# Patient Record
Sex: MALE | Race: WHITE
[De-identification: names, ages, dates, MRNs, and addresses within clinical notes are randomized per-mention and may not be internally consistent; named-entity substitution may affect disease eponyms.]

---

## 2018-03-09 ENCOUNTER — HOSPITAL ENCOUNTER (EMERGENCY)
Dept: HOSPITAL 80 - FED | Age: 73
Discharge: HOME | End: 2018-03-09
Payer: COMMERCIAL

## 2018-03-09 VITALS — OXYGEN SATURATION: 93 % | DIASTOLIC BLOOD PRESSURE: 96 MMHG | HEART RATE: 74 BPM | SYSTOLIC BLOOD PRESSURE: 154 MMHG

## 2018-03-09 VITALS — RESPIRATION RATE: 18 BRPM | TEMPERATURE: 97.9 F

## 2018-03-09 DIAGNOSIS — Z79.01: ICD-10-CM

## 2018-03-09 DIAGNOSIS — N28.1: Primary | ICD-10-CM

## 2018-03-09 LAB — PLATELET # BLD: 131 10^3/UL (ref 150–400)

## 2018-03-09 PROCEDURE — 99285 EMERGENCY DEPT VISIT HI MDM: CPT

## 2018-03-09 PROCEDURE — 74177 CT ABD & PELVIS W/CONTRAST: CPT

## 2018-03-09 PROCEDURE — 93005 ELECTROCARDIOGRAM TRACING: CPT

## 2018-03-09 NOTE — EDPHY
H & P


Time Seen by Provider: 03/09/18 15:46


HPI/ROS: 





CHIEF COMPLAINT:  Abdominal pain





HISTORY OF PRESENT ILLNESS:  Patient is had intermittent symptoms for the last 2

-3 weeks.  Since yesterday at 7:00 p.m. He has had central abdominal pain not 

helped by Pepto-Bismol.  No urinary symptoms or fever or vomiting.  He has 

decreased appetite but no diarrhea.  He had a bowel movement this morning but 

did not have one with coffee which is unusual for him.  He presents with 

continued central abdominal pain.  Mild symptoms, do not radiate.


Symptoms are not exertional or changed with oral intake.





REVIEW OF SYSTEMS:


Eye: no change in vision


ENT: no sore throat


Cardiac: no chest pain or syncope


Pulmonary: no cough or SOB


Abdomen:  HPI


Musculoskeletal: no back pain


Skin: no rash


Neuro: no headache


Constitutional: no fever


: no urinary symptoms





A comprehensive 10 point review of systems is otherwise negative aside from 

elements mentioned in the history of present illness.





PAST MEDICAL HISTORY:  Hypothyroid and prostatectomy





Social history:  Nonsmoker





General Appearance: Alert and conversant, cooperative.


Eyes: No scleral  icterus. 


ENT, Mouth: Normal mucous membranes.


Respiratory: Normal respiratory effort, breath sounds equal, lungs are clear to 

auscultation.


Cardiovascular:  Regular rate and rhythm.


Gastrointestinal:  Mild central and epigastric abdominal tenderness without 

rebound or guarding.  No hernia or pulsatile mass appreciated.


Neurological: Alert, face symmetric, normal motor and sensory in extremities.   

Ambulatory.


Skin: Warm and dry, no rashes.


Musculoskeletal: No peripheral edema.


Psychiatric: Not agitated.





Emergency Department course/MDM:


Declined medication for pain.  CT abdomen and pelvis with IV contrast discussed 

and consented.  He was seen earlier at urgent care.


1622:  Postvoid bladder residual is 0. 





1723:  Right renal cyst 14.8cm per [place], otherwise negative.  Symptoms 

likely due to this large mass.  I think at this point based on the evaluation 

is unlikely he has pancreatitis or gallbladder disease, or other intra-

abdominal surgical process.  Cardiac problem also unlikely.


1728:  Discussed with Adalberto who will see the patient in the office early next 

week.  Discussed with the patient.  Plan for oral [place] and outpatient urology 

follow-up next week, patient is in agreement with this plan.


Smoking Status: Never smoked


Constitutional: 


 Initial Vital Signs











Temperature (C)  36.4 C   03/09/18 15:43


 


Heart Rate  75   03/09/18 15:43


 


Respiratory Rate  19   03/09/18 15:43


 


Blood Pressure  158/103 H  03/09/18 15:43


 


O2 Sat (%)  94   03/09/18 15:43








 











O2 Delivery Mode               Room Air














Allergies/Adverse Reactions: 


 





No Known Allergies Allergy (Unverified 01/10/17 11:43)


 








Home Medications: 














 Medication  Instructions  Recorded


 


Acetaminophen [Tylenol 325mg (*)] 650 - 1,300 mg PO DAILY PRN 01/10/17


 


Herbals/Supplements -Info Only 1 ea PO DAILY 01/10/17


 


Levothyroxine [Synthroid 50 mcg 50 mcg PO DAILY06 01/10/17





(*)]  


 


Melatonin [Melatonin 3 MG (*)] 9 mg PO HS PRN 01/10/17


 


diphenhydrAMINE [Benadryl 25 MG 25 mg PO DAILY PRN 01/10/17





(*)]  


 


Acetaminophen [Tylenol 325mg (*)] 650 mg PO Q6HRS #0 tab 02/11/17


 


Cyclobenzaprine [Flexeril 10 MG 10 mg PO Q8HRS PRN #30 tab 02/11/17





(*)]  


 


Enoxaparin [Lovenox 40 MG (*)] 40 mg SC DAILY #4 syr 02/11/17


 


Sennosides/Docusate Sodium 1 - 2 tab PO BID #0 tab 02/11/17





[Senokot-S]  


 


Warfarin Sodium [Coumadin 5MG (*)] 5 mg PO DAILY AT 4PM #20 tab 02/11/17


 


celeCOXIB [Celebrex (*)] 200 mg PO DAILY #20 cap 02/11/17


 


oxyCODONE IR [Oxycodone Ir (*)] 5 - 10 mg PO Q3HRS PRN #120 tab 02/11/17


 


Hydrocodone/APAP 5/325 [Norco 0.5 - 1 tab PO Q4-6PRN PRN #11 tab 03/09/18





5/325]  














Medical Decision Making





- Diagnostics


EKG Interpretation: 





12-lead EKG interpreted by me; official reading is in trace master.  My 

interpretation is sinus rhythm rate 71 with borderline left axis but no acute 

ischemic changes.


Imaging Results: 


 Imaging Impressions





Abdomen CT  03/09/18 16:14


Impression:  


1. No acute findings in the abdomen or pelvis.


2. Large hiatal hernia.


3. 14.8 cm benign-appearing right renal cyst.


4. Degenerative change in the spine, as above.


5. Mild splenomegaly.


6. Additional findings as above.


 


 











Imaging: Discussed imaging studies w/ On call Radiologist


Differential Diagnosis: 





Differential diagnosis considered for abdominal pain including but not limited 

to vascular problem, urinary retention, appendicitis, cholecystitis, 

pancreatitis, gastritis and urinary tract infection.





- Data Points


Laboratory Results: 


 Laboratory Results





 03/09/18 16:05 





 03/09/18 16:05 





 











  03/09/18 03/09/18 03/09/18





  16:07 16:05 16:05


 


WBC      





    


 


RBC      





    


 


Hgb      





    


 


POC Hgb  15.3 gm/dL gm/dL    





   (13.7-17.5)   


 


Hct      





    


 


POC Hct  45 % %    





   (40-51)   


 


MCV      





    


 


MCH      





    


 


MCHC      





    


 


RDW      





    


 


Plt Count      





    


 


MPV      





    


 


Neut % (Auto)      





    


 


Lymph % (Auto)      





    


 


Mono % (Auto)      





    


 


Eos % (Auto)      





    


 


Baso % (Auto)      





    


 


Nucleat RBC Rel Count      





    


 


Absolute Neuts (auto)      





    


 


Absolute Lymphs (auto)      





    


 


Absolute Monos (auto)      





    


 


Absolute Eos (auto)      





    


 


Absolute Basos (auto)      





    


 


Absolute Nucleated RBC      





    


 


Immature Gran %      





    


 


Immature Gran #      





    


 


POC Sodium  142 mEq/L mEq/L    





   (135-145)   


 


Sodium      141 mEq/L mEq/L





     (135-145) 


 


POC Potassium  3.7 mEq/L mEq/L    





   (3.3-5.0)   


 


Potassium      3.9 mEq/L mEq/L





     (3.5-5.2) 


 


POC Chloride  105 mEq/L mEq/L    





   ()   


 


Chloride      105 mEq/L mEq/L





     () 


 


Carbon Dioxide      25 mEq/l mEq/l





     (22-31) 


 


Anion Gap      11 mEq/L mEq/L





     (8-16) 


 


POC BUN  15 mg/dL mg/dL    





   (7-23)   


 


BUN      15 mg/dL mg/dL





     (7-23) 


 


Creatinine      1.0 mg/dL mg/dL





     (0.7-1.3) 


 


POC Creatinine  1.0 mg/dL mg/dL    





   (0.7-1.3)   


 


Estimated GFR      > 60 





    


 


Glucose      96 mg/dL mg/dL





     () 


 


POC Glucose  102 mg/dL H mg/dL    





   ()   


 


Calcium      9.6 mg/dL mg/dL





     (8.5-10.4) 


 


Total Bilirubin      0.6 mg/dL mg/dL





     (0.1-1.4) 


 


Conjugated Bilirubin      0.4 mg/dL mg/dL





     (0.0-0.5) 


 


Unconjugated Bilirubin      0.2 mg/dL mg/dL





     (0.0-1.1) 


 


AST      18 IU/L IU/L





     (17-59) 


 


ALT      33 IU/L IU/L





     (21-72) 


 


Alkaline Phosphatase      70 IU/L IU/L





     () 


 


Total Protein      7.5 g/dL g/dL





     (6.3-8.2) 


 


Albumin      4.5 g/dL g/dL





     (3.5-5.0) 


 


Lipase      55 IU/L IU/L





     () 


 


Urine Color    PALE YELLOW   





    


 


Urine Appearance    CLEAR   





    


 


Urine pH    5.0   





    (5.0-7.5)  


 


Ur Specific Gravity    1.006   





    (1.002-1.030)  


 


Urine Protein    NEGATIVE   





    (NEGATIVE)  


 


Urine Ketones    NEGATIVE   





    (NEGATIVE)  


 


Urine Blood    NEGATIVE   





    (NEGATIVE)  


 


Urine Nitrate    NEGATIVE   





    (NEGATIVE)  


 


Urine Bilirubin    NEGATIVE   





    (NEGATIVE)  


 


Urine Urobilinogen    NEGATIVE EU EU  





    (0.2-1.0)  


 


Ur Leukocyte Esterase    NEGATIVE   





    (NEGATIVE)  


 


Urine Glucose    NEGATIVE   





    (NEGATIVE)  














  03/09/18





  16:05


 


WBC  5.81 10^3/uL 10^3/uL





   (3.80-9.50) 


 


RBC  4.76 10^6/uL 10^6/uL





   (4.40-6.38) 


 


Hgb  15.1 g/dL g/dL





   (13.7-17.5) 


 


POC Hgb  





  


 


Hct  43.8 % %





   (40.0-51.0) 


 


POC Hct  





  


 


MCV  92.0 fL fL





   (81.5-99.8) 


 


MCH  31.7 pg pg





   (27.9-34.1) 


 


MCHC  34.5 g/dL g/dL





   (32.4-36.7) 


 


RDW  13.2 % %





   (11.5-15.2) 


 


Plt Count  131 10^3/uL L 10^3/uL





   (150-400) 


 


MPV  11.5 fL fL





   (8.7-11.7) 


 


Neut % (Auto)  59.3 % %





   (39.3-74.2) 


 


Lymph % (Auto)  28.9 % %





   (15.0-45.0) 


 


Mono % (Auto)  8.6 % %





   (4.5-13.0) 


 


Eos % (Auto)  2.2 % %





   (0.6-7.6) 


 


Baso % (Auto)  0.5 % %





   (0.3-1.7) 


 


Nucleat RBC Rel Count  0.0 % %





   (0.0-0.2) 


 


Absolute Neuts (auto)  3.44 10^3/uL 10^3/uL





   (1.70-6.50) 


 


Absolute Lymphs (auto)  1.68 10^3/uL 10^3/uL





   (1.00-3.00) 


 


Absolute Monos (auto)  0.50 10^3/uL 10^3/uL





   (0.30-0.80) 


 


Absolute Eos (auto)  0.13 10^3/uL 10^3/uL





   (0.03-0.40) 


 


Absolute Basos (auto)  0.03 10^3/uL 10^3/uL





   (0.02-0.10) 


 


Absolute Nucleated RBC  0.00 10^3/uL 10^3/uL





   (0-0.01) 


 


Immature Gran %  0.5 % %





   (0.0-1.1) 


 


Immature Gran #  0.03 10^3/uL 10^3/uL





   (0.00-0.10) 


 


POC Sodium  





  


 


Sodium  





  


 


POC Potassium  





  


 


Potassium  





  


 


POC Chloride  





  


 


Chloride  





  


 


Carbon Dioxide  





  


 


Anion Gap  





  


 


POC BUN  





  


 


BUN  





  


 


Creatinine  





  


 


POC Creatinine  





  


 


Estimated GFR  





  


 


Glucose  





  


 


POC Glucose  





  


 


Calcium  





  


 


Total Bilirubin  





  


 


Conjugated Bilirubin  





  


 


Unconjugated Bilirubin  





  


 


AST  





  


 


ALT  





  


 


Alkaline Phosphatase  





  


 


Total Protein  





  


 


Albumin  





  


 


Lipase  





  


 


Urine Color  





  


 


Urine Appearance  





  


 


Urine pH  





  


 


Ur Specific Gravity  





  


 


Urine Protein  





  


 


Urine Ketones  





  


 


Urine Blood  





  


 


Urine Nitrate  





  


 


Urine Bilirubin  





  


 


Urine Urobilinogen  





  


 


Ur Leukocyte Esterase  





  


 


Urine Glucose  





  











Point of Care Test Results: 


 











  03/09/18





  16:07


 


POC Sodium  142


 


POC Potassium  3.7


 


POC Chloride  105


 


POC BUN  15


 


POC Creatinine  1.0


 


POC Glucose  102 H














Departure





- Departure


Disposition: Home, Routine, Self-Care


Clinical Impression: 


 Renal cyst





Condition: Good


Instructions:  Kidney Cyst (ED)


Referrals: 


Kim Wallace NP [Primary Care Provider] - As per Instructions


Lucien Glover MD [Medical Doctor] - 3-4 days, if not improved (Please follow-up 

with his urologist early next week.  I discussed the case with him in the 

emergency department.)


Prescriptions: 


Hydrocodone/APAP 5/325 [Norco 5/325] 0.5 - 1 tab PO Q4-6PRN PRN #11 tab


 PRN Reason: For Pain

## 2018-03-09 NOTE — CPEKG
Heart Rate: 71

RR Interval: 845

P-R Interval: 204

QRSD Interval: 88

QT Interval: 396

QTC Interval: 431

P Axis: 8

QRS Axis: -24

T Wave Axis: 23

EKG Severity - OTHERWISE NORMAL ECG -

EKG Impression: SINUS RHYTHM

EKG Impression: BORDERLINE LEFT AXIS DEVIATION

Electronically Signed By: Laurent Jones 09-Mar-2018 18:10:48